# Patient Record
Sex: FEMALE | Race: WHITE
[De-identification: names, ages, dates, MRNs, and addresses within clinical notes are randomized per-mention and may not be internally consistent; named-entity substitution may affect disease eponyms.]

---

## 2020-07-15 ENCOUNTER — HOSPITAL ENCOUNTER (OUTPATIENT)
Dept: HOSPITAL 61 - LAB | Age: 60
Discharge: HOME | End: 2020-07-15
Attending: INTERNAL MEDICINE
Payer: OTHER GOVERNMENT

## 2020-07-15 DIAGNOSIS — Z11.59: Primary | ICD-10-CM

## 2021-09-13 ENCOUNTER — HOSPITAL ENCOUNTER (EMERGENCY)
Dept: HOSPITAL 61 - ER | Age: 61
Discharge: HOME | End: 2021-09-13
Payer: OTHER GOVERNMENT

## 2021-09-13 VITALS — BODY MASS INDEX: 33.87 KG/M2 | HEIGHT: 64 IN | WEIGHT: 198.42 LBS

## 2021-09-13 VITALS — DIASTOLIC BLOOD PRESSURE: 64 MMHG | SYSTOLIC BLOOD PRESSURE: 140 MMHG

## 2021-09-13 DIAGNOSIS — I10: ICD-10-CM

## 2021-09-13 DIAGNOSIS — R06.02: ICD-10-CM

## 2021-09-13 DIAGNOSIS — Z88.0: ICD-10-CM

## 2021-09-13 DIAGNOSIS — Z88.2: ICD-10-CM

## 2021-09-13 DIAGNOSIS — G89.29: ICD-10-CM

## 2021-09-13 DIAGNOSIS — R51.9: ICD-10-CM

## 2021-09-13 DIAGNOSIS — R07.2: ICD-10-CM

## 2021-09-13 DIAGNOSIS — R20.2: Primary | ICD-10-CM

## 2021-09-13 LAB
ALBUMIN SERPL-MCNC: 3.4 G/DL (ref 3.4–5)
ALBUMIN/GLOB SERPL: 0.9 {RATIO} (ref 1–1.7)
ALP SERPL-CCNC: 147 U/L (ref 46–116)
ALT SERPL-CCNC: 18 U/L (ref 14–59)
ANION GAP SERPL CALC-SCNC: 13 MMOL/L (ref 6–14)
AST SERPL-CCNC: 9 U/L (ref 15–37)
BASOPHILS # BLD AUTO: 0 X10^3/UL (ref 0–0.2)
BASOPHILS NFR BLD: 0 % (ref 0–3)
BILIRUB SERPL-MCNC: 0.3 MG/DL (ref 0.2–1)
BUN SERPL-MCNC: 15 MG/DL (ref 7–20)
BUN/CREAT SERPL: 21 (ref 6–20)
CALCIUM SERPL-MCNC: 9.1 MG/DL (ref 8.5–10.1)
CHLORIDE SERPL-SCNC: 102 MMOL/L (ref 98–107)
CO2 SERPL-SCNC: 23 MMOL/L (ref 21–32)
CREAT SERPL-MCNC: 0.7 MG/DL (ref 0.6–1)
EOSINOPHIL NFR BLD: 0.1 X10^3/UL (ref 0–0.7)
EOSINOPHIL NFR BLD: 1 % (ref 0–3)
ERYTHROCYTE [DISTWIDTH] IN BLOOD BY AUTOMATED COUNT: 16.3 % (ref 11.5–14.5)
GFR SERPLBLD BASED ON 1.73 SQ M-ARVRAT: 84.8 ML/MIN
GLUCOSE SERPL-MCNC: 234 MG/DL (ref 70–99)
HCT VFR BLD CALC: 43.8 % (ref 36–47)
HGB BLD-MCNC: 15 G/DL (ref 12–15.5)
LYMPHOCYTES # BLD: 2.9 X10^3/UL (ref 1–4.8)
LYMPHOCYTES NFR BLD AUTO: 24 % (ref 24–48)
MCH RBC QN AUTO: 31 PG (ref 25–35)
MCHC RBC AUTO-ENTMCNC: 34 G/DL (ref 31–37)
MCV RBC AUTO: 90 FL (ref 79–100)
MONO #: 0.6 X10^3/UL (ref 0–1.1)
MONOCYTES NFR BLD: 5 % (ref 0–9)
NEUT #: 8.1 X10^3/UL (ref 1.8–7.7)
NEUTROPHILS NFR BLD AUTO: 69 % (ref 31–73)
PLATELET # BLD AUTO: 373 X10^3/UL (ref 140–400)
POTASSIUM SERPL-SCNC: 3.3 MMOL/L (ref 3.5–5.1)
PROT SERPL-MCNC: 7.4 G/DL (ref 6.4–8.2)
RBC # BLD AUTO: 4.88 X10^6/UL (ref 3.5–5.4)
SODIUM SERPL-SCNC: 138 MMOL/L (ref 136–145)
WBC # BLD AUTO: 11.8 X10^3/UL (ref 4–11)

## 2021-09-13 PROCEDURE — 93005 ELECTROCARDIOGRAM TRACING: CPT

## 2021-09-13 PROCEDURE — 96374 THER/PROPH/DIAG INJ IV PUSH: CPT

## 2021-09-13 PROCEDURE — 80053 COMPREHEN METABOLIC PANEL: CPT

## 2021-09-13 PROCEDURE — 36415 COLL VENOUS BLD VENIPUNCTURE: CPT

## 2021-09-13 PROCEDURE — 71045 X-RAY EXAM CHEST 1 VIEW: CPT

## 2021-09-13 PROCEDURE — 99285 EMERGENCY DEPT VISIT HI MDM: CPT

## 2021-09-13 PROCEDURE — 84484 ASSAY OF TROPONIN QUANT: CPT

## 2021-09-13 PROCEDURE — 85025 COMPLETE CBC W/AUTO DIFF WBC: CPT

## 2021-09-13 PROCEDURE — 70450 CT HEAD/BRAIN W/O DYE: CPT

## 2021-09-13 NOTE — RAD
XR CHEST 1V 



INDICATION: chest pain . 



COMPARISON STUDY: None.



FINDINGS:



Lungs: Normal lung volume. Left basilar heterogeneous opacity. The tracheobronchial tree and hilar st
ructures are normal.



Pleura: No pleural effusion or pneumothorax.



Heart and Mediastinum: The cardiomediastinal silhouette is normal. The great vessels of the thorax ar
e normal.



IMPRESSION:  

Left basilar opacities, which could represent subsegmental atelectasis or potentially an infectious/i
nflammatory process.



Electronically signed by: Viktor Benoit MD (9/13/2021 8:07 PM) [facility]

## 2021-09-13 NOTE — PHYS DOC
General Adult


EDM:


Chief Complaint:  OTHER COMPLAINTS





HPI:


HPI:





Patient is a 61-year-old female with a past medical history hypertension 

hyperlipidemia chronic back pain resulting in permanent paresthesia bilateral 

lower extremity requiring a wheelchair presents with a chief complaint of 

numbness and tingling left side of her face and arm.  Patient states initial 

onset Friday morning.  She states the numbness and tingling is worse in her left

arm and her left face particularly around her mouth lasting for approximately 4 

to 5 hours then symptoms completely resolved.  Patient states later on in the 

evening symptoms returned again with numbness and tingling in arm left face 

particularly around the mouth lasted proximately 4 hours then completely 

resolved.  Patient states that this time she had some numbness and tingling in 

her left hand which still persist.  Exam patient is alert noted x4.  She has no 

facial droop or slurred speech.  I do not see any upper or lower extremity 

weakness.  Patient also complains of chest discomfort that she has had on and 

off for approximately 6 months.  Patient states pain is usually substernal 

associated with shortness of breath and usually resolves when she takes her 

lorazepam.  Patient's last dose of lorazepam was this a.m. prior to her chest 

discomfort episode.  Patient has not taken lorazepam this evening.








NIH stroke scale of 0





Review of Systems:


Constitutional symptoms-  No fever, no chills.


Eyes- No Discharge, No Visual Loss


Respiratory symptoms-  Positive shortness of breath, No wheezing, No Dyspnea on 

Exertion


Cardiovascular Systems; Positive chest pain, No Palpitations, No syncope


Gastrointestinal symptoms:  NO abdominal pain, no nausea, no vomiting or 

diarrhea.


Genitourinary symptoms:  No dysuria.  


Musculoskeletal symptoms:  No back pain  No extremity pain.


NEUROLOGICAL Symptoms:  No headache, no generalized weakness; No focal Weakness 

positive paresthesia


Skin: No rash.





Heart Score:


C/O Chest Pain:  Yes


HEART Score for Chest Pain:  








HEART Score for Chest Pain Response (Comments) Value


 


History Slighlty/Non-Suspicious 0


 


ECG Normal 0


 


Age >45 - < 65 1


 


Risk Factors                            1 or 2 Risk Factors 1


 


Total  2








Risk Factors:


Risk Factors:  DM, Current or recent (<one month) smoker, HTN, HLP, family 

history of CAD, obesity.


Risk Scores:


Score 0 - 3:  2.5% MACE over next 6 weeks - Discharge Home


Score 4 - 6:  20.3% MACE over next 6 weeks - Admit for Clinical Observation


Score 7 - 10:  72.7% MACE over next 6 weeks - Early Invasive Strategies





Allergies:


Allergies:





Allergies








Coded Allergies Type Severity Reaction Last Updated Verified


 


  Penicillins Allergy Intermediate  7/17/20 Yes


 


  Sulfa (Sulfonamide Antibiotics) Allergy Intermediate  7/17/20 Yes











Physical Exam:


PE:


General: alert, no acute distress.


Skin: warm, dry and intact, no erythema, no rash. 


HENT: bilateral external ears normal, oropharynx moist, nose normal.


Head::  Normocephalic, atraumatic.


Neck:   Trachea midline.


Eyes: EOMI, Normal conjunctiva, No drainage


CARDIOVASCULAR:  Regular rate and rhythm


RESPIRATORY:  No respiratory distress


Back:  Full range of motion.


MUSCULOSKELETAL:  Full range of motion of bilateral upper and lower extremities.


GASTROINTESTINAL: Abdomen soft without rebound or guarding.


NEUROLOGICAL:  Alert and noted to person, place and time.  No neurological 

deficits observed


Psychiatric:  Cooperative.  Normal judgment





EKG:


EKG:


[]


Performed at 1915


Rate 88


Sinus rhythm


No ST elevation


No ST depression


No acute MI





Radiology/Procedures:


Radiology/Procedures:


[]


Impression:


CT HEAD





INDICATION: Reason: paresthesia / Spl. Instructions:  / History: 





COMPARISON: None Available.





Exposure: One or more of the following individualized dose reduction techniques 

were utilized for this examination:  1. Automated exposure control  2. 

Adjustment of the mA and/or kV according to patient size  3. Use of iterative 

reconstruction technique





TECHNIQUE: 5 mm contiguous axial images were obtained from the skull base to the

 vertex in both bone and soft tissue algorithm.  





FINDINGS: 





Mild bilateral periventricular white matter hypodensities likely chronic small 

vessel ischemic disease.  





No evidence of acute intracranial hemorrhage.   No extra-axial fluid 

collections.





No mass effect or midline shift. Ventricular size is appropriate.  Basal 

cisterns are patent.





No fractures identified.Gray-white differentiation is preserved.Globes and 

orbits are within normal limits.   Paranasal sinuses and mastoid air cells are 

clear.   





IMPRESSION:


No acute intracranial findings.





Course & Med Decision Making:


Course & Med Decision Making


Pertinent Labs and Imaging studies reviewed. (See chart for details)





[] Patient was evaluated for chief complaint.  Work-up consisted of laboratory 

analysis and radiologic imaging.  Results reviewed and discussed with patient 

and family.  CT head without acute abnormalities.  Patient without any focal 

neurological deficit NIH stroke scale of 0.


Patient's chest discomfort and anxiety resolved with benzodiazepine.  Discussed 

hospitalization for patient to be evaluated by neurology--vies of the benefits 

of hospitalization her and her  decided against and will follow up on an 

outpatient basis.  Advised to return to the ER if symptoms return persist or any

 new concerning symptoms arise.





Dragon Disclaimer:


Dragon Disclaimer:


This electronic medical record was generated, in whole or in part, using a voice

 recognition dictation system.





Departure


Departure


Impression:  


   Primary Impression:  


   Paresthesia


Condition:  STABLE


Referrals:  


RICO PURVIS (PCP)


Patient Instructions:  Paresthesia











JING RÍOS DO            Sep 13, 2021 19:33

## 2021-09-13 NOTE — RAD
CT HEAD



INDICATION: Reason: paresthesia / Spl. Instructions:  / History: 



COMPARISON: None Available.



Exposure: One or more of the following individualized dose reduction techniques were utilized for thi
s examination:  1. Automated exposure control  2. Adjustment of the mA and/or kV according to patient
 size  3. Use of iterative reconstruction technique



TECHNIQUE: 5 mm contiguous axial images were obtained from the skull base to the vertex in both bone 
and soft tissue algorithm.  



FINDINGS: 



Mild bilateral periventricular white matter hypodensities likely chronic small vessel ischemic diseas
e.  



No evidence of acute intracranial hemorrhage.   No extra-axial fluid collections.



No mass effect or midline shift. Ventricular size is appropriate.  Basal cisterns are patent.



No fractures identified.Gray-white differentiation is preserved.Globes and orbits are within normal l
imits.   Paranasal sinuses and mastoid air cells are clear.   



IMPRESSION:

No acute intracranial findings. 



Electronically signed by: Arthur Mims MD (9/13/2021 8:46 PM) [id number]

## 2021-09-14 NOTE — EKG
[facility]

              [address] [place] [address]

Test Date:    2021               Test Time:    19:14:05

Pat Name:     ROYAL FOY        Department:   

Patient ID:   [id number]           Room:          

Gender:       F                        Technician:   

:          1960               Requested By: JING RÍOS

Order Number: [id number]           Reading MD:   Rigoberto Kinsey

                                 Measurements

Intervals                              Axis          

Rate:         87                       P:            127

AR:           192                      QRS:          -12

QRSD:         76                       T:            62

QT:           356                                    

QTc:          429                                    

                           Interpretive Statements

SINUS RHYTHM

LEFT ATRIAL ABNORMALITY

LEFTWARD AXIS

QRS(T) CONTOUR ABNORMALITY

CONSISTENT WITH INFERIOR INFARCT

PROBABLY OLD

ABNORMAL ECG

RI6.02

No previous ECG available for comparison

Electronically Signed On 2021 13:02:41 CDT by Rigoberto Kinsey

## 2021-09-14 NOTE — EKG
[facility]

              [address] [place] [address]

Test Date:    2021               Test Time:    19:15:50

Pat Name:     ROYAL FOY        Department:   

Patient ID:   [id number]           Room:          

Gender:       F                        Technician:   

:          1960               Requested By: JING RÍOS

Order Number: [id number]           Reading MD:   Rigoberto Kinsey

                                 Measurements

Intervals                              Axis          

Rate:         88                       P:            31

FL:           188                      QRS:          -2

QRSD:         72                       T:            67

QT:           368                                    

QTc:          449                                    

                           Interpretive Statements

SINUS RHYTHM

LEFT ATRIAL ABNORMALITY

LEFTWARD AXIS

ABNORMAL ECG

Electronically Signed On 2021 13:02:33 CDT by Rigoberto Kinsey